# Patient Record
Sex: FEMALE | URBAN - METROPOLITAN AREA
[De-identification: names, ages, dates, MRNs, and addresses within clinical notes are randomized per-mention and may not be internally consistent; named-entity substitution may affect disease eponyms.]

---

## 2018-09-13 ENCOUNTER — APPOINTMENT (RX ONLY)
Dept: URBAN - METROPOLITAN AREA CLINIC 23 | Facility: CLINIC | Age: 73
Setting detail: DERMATOLOGY
End: 2018-09-13

## 2018-09-13 DIAGNOSIS — Z41.9 ENCOUNTER FOR PROCEDURE FOR PURPOSES OTHER THAN REMEDYING HEALTH STATE, UNSPECIFIED: ICD-10-CM

## 2018-09-13 PROCEDURE — ? DYSPORT

## 2018-09-13 NOTE — PROCEDURE: DYSPORT
Topical Anesthesia?: 3% lidocaine, 6% prilocaine
Expiration Date (Month Year): 04/2019
Dilution (U/ 0.1cc): 1.5
Additional Area 4 Units: 0
Price (Use Numbers Only, No Special Characters Or $): 0.00
Consent: Written consent obtained. Risks include but not limited to lid/brow ptosis, bruising, swelling, diplopia, temporary effect, incomplete chemical denervation.
Detail Level: Simple
Additional Area 1 Units: [address]
Additional Area 5 Location: bunny lines
Additional Area 3 Location: high forehead
Additional Area 4 Location: chin
Lot #: [id number]
Additional Area 3 Units: [address] Rohit Cobb
Additional Area 2 Units: 30
Additional Area 1 Location: glabella
Length Of Topical Anesthesia Application (Optional): 15 minutes
Additional Area 2 Location: Lateral eyes

## 2018-09-13 NOTE — PROCEDURE: MIPS QUALITY
Detail Level: Zone
Quality 110: Preventive Care And Screening: Influenza Immunization: Influenza Immunization not Administered for Documented Reasons.
Quality 111:Pneumonia Vaccination Status For Older Adults: Pneumococcal Vaccination Previously Received

## 2019-04-24 ENCOUNTER — APPOINTMENT (RX ONLY)
Dept: URBAN - METROPOLITAN AREA CLINIC 23 | Facility: CLINIC | Age: 74
Setting detail: DERMATOLOGY
End: 2019-04-24

## 2019-04-24 DIAGNOSIS — Z41.9 ENCOUNTER FOR PROCEDURE FOR PURPOSES OTHER THAN REMEDYING HEALTH STATE, UNSPECIFIED: ICD-10-CM

## 2019-04-24 PROCEDURE — ? DIAGNOSIS COMMENT

## 2019-04-24 PROCEDURE — ? DYSPORT

## 2019-04-24 PROCEDURE — ? FILLERS

## 2019-04-24 PROCEDURE — ? PULSED-DYE LASER

## 2019-04-24 NOTE — PROCEDURE: FILLERS
Lateral Face Filler  Volume In Cc: 0
Additional Area 1 Location: perioral fine lines, lateral mouth fine line ( shared with mother)
Additional Area 5 Location: Perioral
Include Cannula Information In Note?: No
Additional Area 1 Location: lateral mouth, fine lines perioral, marionette lines, lateral cheeks, vermillion lips
Additional Area 2 Location: Lips, oral commissure, glabellar fine fines
Lot #: [id number]
Map Statment: See [address] for Complete Details
Additional Area 2 Location: Nasalabial, Glabellar fine lines- Complimentary
Additional Area 3 Location: Glabbellar fine lines, Nasalabial folds, Marionette lines, vermillion lip
Anesthesia Type: 1% lidocaine without epinephrine
Expiration Date (Month Year): 01/31/21
Include Cannula Size?: 25G
Lot #: [id number]
Additional Anesthesia Volume In Cc: 6
Consent: Written consent obtained. Risks include but not limited to bruising, beading, irregular texture, ulceration, infection, allergic reaction, scar formation, incomplete augmentation, temporary nature, procedural pain.
Additional Area 1 Volume In Cc: 1
Additional Area 1 Location: lateral jawline, marionettes lines, lateral mouth and lateral cheeks
Post-Care Instructions: Patient instructed to apply ice to reduce swelling.
Expiration Date (Month Year): 06/23/2020
Additional Area 2 Location: lateral mouth fine lines, marionette fine lines, glabellar fine lines, above the brow fine lines
Include Cannula Length?: 1.5 inch
Detail Level: Zone
Lot #: [id number]
Expiration Date (Month Year): 08/31/2019
Include Cannula Brand?: DermaSculpt
Filler: Hong Davis
Price (Use Numbers Only, No Special Characters Or $): 0.00
Additional Area 4 Location: lateral jawline, lateral cheeks, oral commissure

## 2019-04-24 NOTE — PROCEDURE: DYSPORT
Nasal Root Units: 0
Additional Area 3 Units: 10
Additional Area 5 Location: chin
Additional Area 1 Location: lateral eyes 1.5 cm from bilateral canthus ( touch-up)
Consent: Written consent obtained. Risks include but not limited to lid/brow ptosis, bruising, swelling, diplopia, temporary effect, incomplete chemical denervation.
Lot #: [id number]
Additional Area 5 Units: 4
Dilution (U/ 0.1cc): 1.5
Detail Level: Simple
Additional Area 2 Location: lateral eyes
Additional Area 2 Units: [address] [facility]
Additional Area 4 Location: 2cm high forehead
Glabellar Complex Units: 48
Additional Area 4 Units: [address] Rohit Cobb
Expiration Date (Month Year): 10/31/2019
Price (Use Numbers Only, No Special Characters Or $): 0.00
Additional Area 3 Location: crows feet

## 2020-03-02 ENCOUNTER — APPOINTMENT (RX ONLY)
Dept: URBAN - METROPOLITAN AREA CLINIC 23 | Facility: CLINIC | Age: 75
Setting detail: DERMATOLOGY
End: 2020-03-02

## 2020-03-02 DIAGNOSIS — L82.0 INFLAMED SEBORRHEIC KERATOSIS: ICD-10-CM

## 2020-03-02 DIAGNOSIS — D485 NEOPLASM OF UNCERTAIN BEHAVIOR OF SKIN: ICD-10-CM

## 2020-03-02 DIAGNOSIS — Z41.9 ENCOUNTER FOR PROCEDURE FOR PURPOSES OTHER THAN REMEDYING HEALTH STATE, UNSPECIFIED: ICD-10-CM

## 2020-03-02 PROBLEM — D48.5 NEOPLASM OF UNCERTAIN BEHAVIOR OF SKIN: Status: ACTIVE | Noted: 2020-03-02

## 2020-03-02 PROCEDURE — 11102 TANGNTL BX SKIN SINGLE LES: CPT | Mod: 59

## 2020-03-02 PROCEDURE — ? DYSPORT

## 2020-03-02 PROCEDURE — ? BIOPSY BY SHAVE METHOD

## 2020-03-02 PROCEDURE — ? BENIGN DESTRUCTION

## 2020-03-02 PROCEDURE — ? COUNSELING

## 2020-03-02 PROCEDURE — 17110 DESTRUCTION B9 LES UP TO 14: CPT

## 2020-03-02 ASSESSMENT — LOCATION DETAILED DESCRIPTION DERM
LOCATION DETAILED: RIGHT POSTERIOR AXILLA
LOCATION DETAILED: RIGHT AXILLARY VAULT
LOCATION DETAILED: RIGHT INFERIOR LATERAL FOREHEAD

## 2020-03-02 ASSESSMENT — LOCATION ZONE DERM
LOCATION ZONE: FACE
LOCATION ZONE: AXILLAE

## 2020-03-02 ASSESSMENT — LOCATION SIMPLE DESCRIPTION DERM
LOCATION SIMPLE: RIGHT AXILLARY VAULT
LOCATION SIMPLE: RIGHT FOREHEAD
LOCATION SIMPLE: RIGHT POSTERIOR AXILLA

## 2020-03-02 NOTE — PROCEDURE: BIOPSY BY SHAVE METHOD
Detail Level: Detailed
Depth Of Biopsy: dermis
Was A Bandage Applied: Yes
Size Of Lesion In Cm: 0
Biopsy Type: H and E
Biopsy Method: 15 blade
Anesthesia Type: 1% lidocaine without epinephrine
Anesthesia Volume In Cc (Will Not Render If 0): 0.3
Hemostasis: Electrocautery
Wound Care: Vaseline
Dressing: bandage
Destruction After The Procedure: No
Type Of Destruction Used: Curettage
Curettage Text: The wound bed was treated with curettage after the biopsy was performed.
Cryotherapy Text: The wound bed was treated with cryotherapy after the biopsy was performed.
Electrodesiccation Text: The wound bed was treated with electrodesiccation after the biopsy was performed.
Electrodesiccation And Curettage Text: The wound bed was treated with electrodesiccation and curettage after the biopsy was performed.
Silver Nitrate Text: The wound bed was treated with silver nitrate after the biopsy was performed.
Lab: [facility]
Lab Facility: [address]
Consent: The provider's intent is to obtain a tissue sample solely for diagnostic purposes. Written consent to obtain tissue sample was obtained and risks were reviewed including but not limited to scarring, infection, bleeding, scabbing, incomplete removal, nerve damage and allergy to anesthesia.
Post-Care Instructions: I reviewed with the patient in detail post-care instructions. Patient is to keep the biopsy site dry overnight, and then apply bacitracin twice daily until healed. Patient may apply hydrogen peroxide soaks to remove any crusting.
Notification Instructions: Patient will be notified of biopsy results. However, patient instructed to call the office if not contacted within 2 weeks.
Billing Type: [employer]

## 2020-03-02 NOTE — PROCEDURE: DYSPORT
Additional Area 3 Location: high forehead 3 cm above brows
Consent: Written consent obtained. Risks include but not limited to lid/brow ptosis, bruising, swelling, diplopia, temporary effect, incomplete chemical denervation.
Additional Area 2 Location: crows feet
Price (Use Numbers Only, No Special Characters Or $): 0.00
Masseter Units: 0
Additional Area 4 Location: 2cm high forehead
Glabellar Complex Units: [address]
Additional Area 1 Location: 2.5cm high forehead
Lot #: [id number]
Detail Level: Simple
Dilution (U/ 0.1cc): 1.5
Additional Area 5 Location: glabella
Additional Area 1 Units: 10
Expiration Date (Month Year): 09/30/20

## 2020-03-02 NOTE — PROCEDURE: BENIGN DESTRUCTION
Add 52 Modifier (Optional): no
Consent: The patient's consent was obtained including but not limited to risks of crusting, scabbing, blistering, scarring, darker or lighter pigmentary change, recurrence, incomplete removal and infection.
Detail Level: Simple
Render Post-Care Instructions In Note?: yes
Anesthesia Volume In Cc: 0.3
Post-Care Instructions: I reviewed with the patient in detail post-care instructions. Patient is to wear sunprotection, and avoid picking at any of the treated lesions. Pt may apply Vaseline to crusted or scabbing areas.
Treatment Number (Will Not Render If 0): 0
Medical Necessity Information: It is in your best interest to select a reason for this procedure from the list below. All of these items fulfill various CMS LCD requirements except the new and changing color options.
Medical Necessity Clause: This procedure was medically necessary because the lesions that were treated were:

## 2020-11-17 ENCOUNTER — APPOINTMENT (RX ONLY)
Dept: URBAN - METROPOLITAN AREA CLINIC 23 | Facility: CLINIC | Age: 75
Setting detail: DERMATOLOGY
End: 2020-11-17

## 2020-11-17 VITALS — TEMPERATURE: 97.7 F

## 2020-11-17 DIAGNOSIS — L81.4 OTHER MELANIN HYPERPIGMENTATION: ICD-10-CM

## 2020-11-17 DIAGNOSIS — L82.1 OTHER SEBORRHEIC KERATOSIS: ICD-10-CM

## 2020-11-17 DIAGNOSIS — L70.0 ACNE VULGARIS: ICD-10-CM

## 2020-11-17 DIAGNOSIS — Z41.9 ENCOUNTER FOR PROCEDURE FOR PURPOSES OTHER THAN REMEDYING HEALTH STATE, UNSPECIFIED: ICD-10-CM

## 2020-11-17 DIAGNOSIS — D18.0 HEMANGIOMA: ICD-10-CM

## 2020-11-17 PROBLEM — D18.01 HEMANGIOMA OF SKIN AND SUBCUTANEOUS TISSUE: Status: ACTIVE | Noted: 2020-11-17

## 2020-11-17 PROCEDURE — ? ACNE SURGERY

## 2020-11-17 PROCEDURE — ? COUNSELING

## 2020-11-17 PROCEDURE — ? DYSPORT

## 2020-11-17 PROCEDURE — 10040 EXTRACTION: CPT

## 2020-11-17 PROCEDURE — 99214 OFFICE O/P EST MOD 30 MIN: CPT | Mod: 25

## 2020-11-17 ASSESSMENT — LOCATION ZONE DERM
LOCATION ZONE: EAR
LOCATION ZONE: TRUNK

## 2020-11-17 ASSESSMENT — LOCATION SIMPLE DESCRIPTION DERM
LOCATION SIMPLE: RIGHT UPPER BACK
LOCATION SIMPLE: LEFT UPPER BACK
LOCATION SIMPLE: RIGHT EAR

## 2020-11-17 ASSESSMENT — LOCATION DETAILED DESCRIPTION DERM
LOCATION DETAILED: LEFT SUPERIOR UPPER BACK
LOCATION DETAILED: RIGHT SUPERIOR MEDIAL UPPER BACK
LOCATION DETAILED: LEFT MEDIAL UPPER BACK
LOCATION DETAILED: RIGHT SUPERIOR UPPER BACK
LOCATION DETAILED: RIGHT POSTERIOR EAR

## 2020-11-17 NOTE — PROCEDURE: DYSPORT
Additional Area 3 Location: high forehead 3 cm above brows
Consent: Written consent obtained. Risks include but not limited to lid/brow ptosis, bruising, swelling, diplopia, temporary effect, incomplete chemical denervation.
Additional Area 2 Location: crows feet
Price (Use Numbers Only, No Special Characters Or $): 0.00
Masseter Units: 0
Additional Area 4 Location: 2cm high forehead
Glabellar Complex Units: [address]
Additional Area 1 Location: 2.5cm high forehead
Lot #: [id number]
Detail Level: Simple
Dilution (U/ 0.1cc): 1.5
Additional Area 5 Location: glabella
Additional Area 1 Units: 10
Expiration Date (Month Year): 06/30/2021

## 2020-11-17 NOTE — PROCEDURE: ACNE SURGERY
Render Post-Care Instructions In Note?: no
Hemostasis: Pressure
Post-Care Instructions: I reviewed with the patient in detail post-care instructions. Patient is to keep the treatment areas dry overnight, and then apply bacitracin twice daily until healed. Patient may apply hydrogen peroxide soaks to remove any crusting.
Consent was obtained and risks were reviewed including but not limited to scarring, infection, bleeding, scabbing, incomplete removal, and allergy to anesthesia.
Prep Text (Optional): Prior to removal the treatment areas were prepped in the usual fashion.  1% lidocaine with epi was used to anesthetize the area
Extraction Method: lancet and q-tip
Detail Level: Detailed
Acne Type: Comedonal Lesions

## 2021-03-19 ENCOUNTER — APPOINTMENT (RX ONLY)
Dept: URBAN - METROPOLITAN AREA CLINIC 23 | Facility: CLINIC | Age: 76
Setting detail: DERMATOLOGY
End: 2021-03-19

## 2021-03-19 VITALS — TEMPERATURE: 97.5 F

## 2021-03-19 DIAGNOSIS — L30.9 DERMATITIS, UNSPECIFIED: ICD-10-CM

## 2021-03-19 PROCEDURE — 99212 OFFICE O/P EST SF 10 MIN: CPT

## 2021-03-19 PROCEDURE — ? PRESCRIPTION

## 2021-03-19 PROCEDURE — ? COUNSELING

## 2021-03-19 RX ORDER — CLOBETASOL PROPIONATE 0.5 MG/G
CREAM TOPICAL BID
Qty: 1 | Refills: 1 | Status: ERX | COMMUNITY
Start: 2021-03-19

## 2021-03-19 RX ADMIN — CLOBETASOL PROPIONATE 1: 0.5 CREAM TOPICAL at 00:00

## 2021-03-19 ASSESSMENT — LOCATION SIMPLE DESCRIPTION DERM: LOCATION SIMPLE: LEFT FOREARM

## 2021-03-19 ASSESSMENT — LOCATION ZONE DERM: LOCATION ZONE: ARM

## 2021-03-19 ASSESSMENT — LOCATION DETAILED DESCRIPTION DERM: LOCATION DETAILED: LEFT PROXIMAL DORSAL FOREARM

## 2021-06-17 ENCOUNTER — APPOINTMENT (RX ONLY)
Dept: URBAN - METROPOLITAN AREA CLINIC 23 | Facility: CLINIC | Age: 76
Setting detail: DERMATOLOGY
End: 2021-06-17

## 2021-06-17 DIAGNOSIS — Z41.9 ENCOUNTER FOR PROCEDURE FOR PURPOSES OTHER THAN REMEDYING HEALTH STATE, UNSPECIFIED: ICD-10-CM

## 2021-06-17 PROCEDURE — ? PULSED-DYE LASER

## 2021-06-17 PROCEDURE — ? DYSPORT

## 2021-06-17 NOTE — PROCEDURE: PULSED-DYE LASER
Delay Time (Ms): [address] [facility]
Laser Type: Vbeam 595nm
Pulse Duration: 10 ms
Cryogen Time (Ms): 27
Consent: Written consent obtained, risks reviewed including but not limited to crusting, scabbing, blistering, scarring, darker or lighter pigmentary change, incidental hair removal, bruising, and/or incomplete removal.
Fluence In J/Cm2 (Optional): 7.00
Spot Size: 10x3 mm
Location Override: cheek
Delay Time (Ms): 20
Treated Area: small area
Price (Use Numbers Only, No Special Characters Or $): 0.00
Cryogen Time (Ms): [address] Rohit Cobb
Detail Level: Zone
Pulse Duration: 20 ms
Cryogen Time (Ms): 30
Fluence In J/Cm2 (Optional): 7:00
Spot Size: 10 mm
Post-Procedure Care: Sunscreen applied. Post care reviewed with patient.
Delay Time (Ms): 10
Fluence In J/Cm2 (Optional): 10.0
Spot Size: 7 mm
Post-Care Instructions: I reviewed with the patient in detail post-care instructions. Patient should stay away from the sun and wear sun protection until treated areas are fully healed.

## 2021-06-17 NOTE — PROCEDURE: DYSPORT
Additional Area 3 Location: high forehead 3 cm above brows
Show Mentalis Units: No
[facility] Units: 0
Additional Area 5 Location: glabella
Show Glabellar Units: Yes
Lot #: [id number]
Price (Use Numbers Only, No Special Characters Or $): 0.00
Additional Area 2 Location: crows feet
Additional Area 4 Location: 2cm high forehead
Expiration Date (Month Year): 03/31/22
Detail Level: Simple
Additional Area 1 Units: 10
Glabellar Complex Units: 48
Consent: Written consent obtained. Risks include but not limited to lid/brow ptosis, bruising, swelling, diplopia, temporary effect, incomplete chemical denervation.
Dilution (U/ 0.1cc): 1.5
Additional Area 1 Location: 2.5cm high forehead

## 2021-11-30 ENCOUNTER — APPOINTMENT (RX ONLY)
Dept: URBAN - METROPOLITAN AREA CLINIC 23 | Facility: CLINIC | Age: 76
Setting detail: DERMATOLOGY
End: 2021-11-30

## 2021-11-30 DIAGNOSIS — D22 MELANOCYTIC NEVI: ICD-10-CM

## 2021-11-30 DIAGNOSIS — D18.0 HEMANGIOMA: ICD-10-CM

## 2021-11-30 DIAGNOSIS — Z41.9 ENCOUNTER FOR PROCEDURE FOR PURPOSES OTHER THAN REMEDYING HEALTH STATE, UNSPECIFIED: ICD-10-CM

## 2021-11-30 PROBLEM — D18.01 HEMANGIOMA OF SKIN AND SUBCUTANEOUS TISSUE: Status: ACTIVE | Noted: 2021-11-30

## 2021-11-30 PROBLEM — D22.5 MELANOCYTIC NEVI OF TRUNK: Status: ACTIVE | Noted: 2021-11-30

## 2021-11-30 PROCEDURE — ? COUNSELING

## 2021-11-30 PROCEDURE — 99213 OFFICE O/P EST LOW 20 MIN: CPT

## 2021-11-30 PROCEDURE — ? DYSPORT

## 2021-11-30 PROCEDURE — ? SUNSCREEN RECOMMENDATIONS

## 2021-11-30 ASSESSMENT — LOCATION SIMPLE DESCRIPTION DERM: LOCATION SIMPLE: LEFT UPPER BACK

## 2021-11-30 ASSESSMENT — LOCATION DETAILED DESCRIPTION DERM
LOCATION DETAILED: LEFT MEDIAL UPPER BACK
LOCATION DETAILED: LEFT SUPERIOR UPPER BACK

## 2021-11-30 ASSESSMENT — LOCATION ZONE DERM: LOCATION ZONE: TRUNK

## 2021-11-30 NOTE — HPI: FULL BODY SKIN EXAMINATION
What Is The Reason For Today's Visit?: Full Body Skin Examination
What Is The Reason For Today's Visit? (Being Monitored For X): concerning skin lesions on an annual basis
yes

## 2021-11-30 NOTE — PROCEDURE: DYSPORT
Glabellar Complex Units: [address]
Show Additional Area 3: Yes
Show Right And Left Periorbital Units: No
Masseter Units: 0
Additional Area 1 Location: high forehead
Consent: Written consent obtained. Risks include but not limited to lid/brow ptosis, bruising, swelling, diplopia, temporary effect, incomplete chemical denervation.
Additional Area 4 Location: 2cm high forehead
Expiration Date (Month Year): 2022-08
Lot #: [id number]
Price (Use Numbers Only, No Special Characters Or $): 0.00
Additional Area 2 Location: crows feet
Detail Level: Simple
Additional Area 3 Location: high forehead 3 cm above brows
Additional Area 5 Location: glabella
Dilution (U/ 0.1cc): 1.5
Forehead Units: 10

## 2022-01-24 ENCOUNTER — APPOINTMENT (RX ONLY)
Dept: URBAN - METROPOLITAN AREA CLINIC 23 | Facility: CLINIC | Age: 77
Setting detail: DERMATOLOGY
End: 2022-01-24

## 2022-01-24 DIAGNOSIS — Z41.9 ENCOUNTER FOR PROCEDURE FOR PURPOSES OTHER THAN REMEDYING HEALTH STATE, UNSPECIFIED: ICD-10-CM

## 2022-01-24 PROCEDURE — ? DYSPORT

## 2022-01-24 NOTE — PROCEDURE: DYSPORT
Show Nasal Units: Yes
Additional Area 2 Units: 0
Lot #: [id number]
Show Mentalis Units: No
Price (Use Numbers Only, No Special Characters Or $): 0.00
Additional Comments: periorbital 10 units NC
Additional Area 3 Location: high forehead 3 cm above brows
Additional Area 5 Location: glabella
Additional Area 1 Location: neck
Dilution (U/ 0.1cc): 1.5
Additional Area 1 Units: [address] [facility]
Consent: Written consent obtained. Risks include but not limited to lid/brow ptosis, bruising, swelling, diplopia, temporary effect, incomplete chemical denervation.
Additional Area 4 Location: 2cm high forehead
Detail Level: Simple
Periorbital Skin Units: 10
Expiration Date (Month Year): 2022-08
Additional Area 2 Location: crows feet

## 2022-01-31 ENCOUNTER — APPOINTMENT (RX ONLY)
Dept: URBAN - METROPOLITAN AREA CLINIC 23 | Facility: CLINIC | Age: 77
Setting detail: DERMATOLOGY
End: 2022-01-31

## 2022-01-31 DIAGNOSIS — Z41.9 ENCOUNTER FOR PROCEDURE FOR PURPOSES OTHER THAN REMEDYING HEALTH STATE, UNSPECIFIED: ICD-10-CM

## 2022-01-31 PROCEDURE — ? DYSPORT

## 2022-01-31 NOTE — PROCEDURE: DYSPORT
Lot #: [id number]
Right Periorbital Units: 0
Show Additional Area 2: Yes
Additional Area 5 Location: glabella
Show Mentalis Units: No
Additional Area 3 Location: high forehead 3 cm above brows
Dilution (U/ 0.1cc): 1.5
Consent: Written consent obtained. Risks include but not limited to lid/brow ptosis, bruising, swelling, diplopia, temporary effect, incomplete chemical denervation.
Additional Area 1 Location: neck bands ( special)
Additional Area 1 Units: 10
Expiration Date (Month Year): 2022-08
Additional Area 4 Location: 2cm high forehead
Additional Area 2 Location: crows feet
Detail Level: Simple
Price (Use Numbers Only, No Special Characters Or $): 0.00

## 2022-03-28 ENCOUNTER — APPOINTMENT (RX ONLY)
Dept: URBAN - METROPOLITAN AREA CLINIC 23 | Facility: CLINIC | Age: 77
Setting detail: DERMATOLOGY
End: 2022-03-28

## 2022-03-28 DIAGNOSIS — Z41.9 ENCOUNTER FOR PROCEDURE FOR PURPOSES OTHER THAN REMEDYING HEALTH STATE, UNSPECIFIED: ICD-10-CM

## 2022-03-28 PROCEDURE — ? DYSPORT

## 2022-09-02 ENCOUNTER — APPOINTMENT (RX ONLY)
Dept: URBAN - METROPOLITAN AREA CLINIC 23 | Facility: CLINIC | Age: 77
Setting detail: DERMATOLOGY
End: 2022-09-02

## 2022-09-02 DIAGNOSIS — L81.5 LEUKODERMA, NOT ELSEWHERE CLASSIFIED: ICD-10-CM

## 2022-09-02 DIAGNOSIS — Z41.9 ENCOUNTER FOR PROCEDURE FOR PURPOSES OTHER THAN REMEDYING HEALTH STATE, UNSPECIFIED: ICD-10-CM

## 2022-09-02 DIAGNOSIS — L81.4 OTHER MELANIN HYPERPIGMENTATION: ICD-10-CM

## 2022-09-02 DIAGNOSIS — D18.0 HEMANGIOMA: ICD-10-CM

## 2022-09-02 PROBLEM — D18.01 HEMANGIOMA OF SKIN AND SUBCUTANEOUS TISSUE: Status: ACTIVE | Noted: 2022-09-02

## 2022-09-02 PROCEDURE — 99213 OFFICE O/P EST LOW 20 MIN: CPT

## 2022-09-02 PROCEDURE — ? DYSPORT

## 2022-09-02 PROCEDURE — ? COUNSELING

## 2022-09-02 ASSESSMENT — LOCATION DETAILED DESCRIPTION DERM
LOCATION DETAILED: LEFT PROXIMAL PRETIBIAL REGION
LOCATION DETAILED: LEFT SUPERIOR ANTERIOR NECK
LOCATION DETAILED: SUPERIOR THORACIC SPINE
LOCATION DETAILED: RIGHT ANTERIOR DISTAL THIGH
LOCATION DETAILED: RIGHT SUPERIOR LATERAL MALAR CHEEK
LOCATION DETAILED: RIGHT PROXIMAL PRETIBIAL REGION
LOCATION DETAILED: INFERIOR MID FOREHEAD
LOCATION DETAILED: LEFT SUPERIOR LATERAL MALAR CHEEK

## 2022-09-02 ASSESSMENT — LOCATION SIMPLE DESCRIPTION DERM
LOCATION SIMPLE: LEFT CHEEK
LOCATION SIMPLE: RIGHT CHEEK
LOCATION SIMPLE: UPPER BACK
LOCATION SIMPLE: INFERIOR FOREHEAD
LOCATION SIMPLE: RIGHT THIGH
LOCATION SIMPLE: RIGHT PRETIBIAL REGION
LOCATION SIMPLE: LEFT PRETIBIAL REGION
LOCATION SIMPLE: LEFT ANTERIOR NECK

## 2022-09-02 ASSESSMENT — LOCATION ZONE DERM
LOCATION ZONE: FACE
LOCATION ZONE: NECK
LOCATION ZONE: TRUNK
LOCATION ZONE: LEG

## 2022-10-17 ENCOUNTER — APPOINTMENT (RX ONLY)
Dept: URBAN - METROPOLITAN AREA CLINIC 23 | Facility: CLINIC | Age: 77
Setting detail: DERMATOLOGY
End: 2022-10-17

## 2022-10-17 DIAGNOSIS — Z41.9 ENCOUNTER FOR PROCEDURE FOR PURPOSES OTHER THAN REMEDYING HEALTH STATE, UNSPECIFIED: ICD-10-CM

## 2022-10-17 PROCEDURE — ? PULSED-DYE LASER

## 2022-10-17 PROCEDURE — ? DYSPORT

## 2022-10-17 NOTE — PROCEDURE: DYSPORT
Depressor Anguli Oris Units: 0
Show Additional Area 6: Yes
Additional Area 2 Location: crows feet
Show Inventory Tab: Show
Consent: Written consent obtained. Risks include but not limited to lid/brow ptosis, bruising, swelling, diplopia, temporary effect, incomplete chemical denervation.
Lot #: [id number]
Show Ucl Units: No
Additional Area 3 Location: glabella
Dilution (U/ 0.1cc): 1.5
Expiration Date (Month Year): 2022-08
Additional Area 4 Location: 2cm high forehead
Additional Area 1 Location: high forehead 3 cm above brows
Price (Use Numbers Only, No Special Characters Or $): 0.00
Detail Level: Simple
Additional Area 1 Units: 15

## 2023-01-10 ENCOUNTER — APPOINTMENT (RX ONLY)
Dept: URBAN - METROPOLITAN AREA CLINIC 23 | Facility: CLINIC | Age: 78
Setting detail: DERMATOLOGY
End: 2023-01-10

## 2023-01-10 DIAGNOSIS — Z41.9 ENCOUNTER FOR PROCEDURE FOR PURPOSES OTHER THAN REMEDYING HEALTH STATE, UNSPECIFIED: ICD-10-CM

## 2023-01-10 PROCEDURE — ? IN-HOUSE DISPENSING PHARMACY

## 2023-01-10 PROCEDURE — ? DYSPORT

## 2023-01-10 NOTE — PROCEDURE: IN-HOUSE DISPENSING PHARMACY
Product 72 Refills: 0
Product 8 Amount/Unit (Numbers Only): 1
Product 42 Unit Type: mg
Product 1 Unit Type: tube(s)
Product 6 Application Directions: Take one tablet for swelling today at the office as indicated and one tablet tomorrow
Name Of Product 2: Valtrex
Name Of Product 11: Clearing gel
Product 6 Unit Type: tablets
Product 11 Application Directions: Apply a thin layer to the acne prone areas in the AM
Name Of Product 7: Latisse 5ml
Name Of Product 19: Diazepam 5mg
Product 2 Application Directions: apply to affected areas left lower leg am and pm  as indicated
Product 11 Unit Type: bottle(s)
Product 9 Unit Type: jar(s)
Product 7 Amount/Unit (Numbers Only): 5
Render Refills If Set To 0: No
Product 5 Application Directions: Take one tablet today after procedure with full glass of water as indicated
Name Of Product 22: Brightening Pads
Name Of Product 1: Hydroquinone 4% / Tretinoin 0.05% / Fluocinolone 0.01%
Product 14 Application Directions: Apply to the under eyes in the Am and pm
Name Of Product 10: Valtrex 500 mg
Product 10 Application Directions: Take 1 tablet 2 times daily for 3 days
Name Of Product 6: Prednisone 20mg
Product 3 Amount/Unit (Numbers Only): 60 [facility]
Product 1 Application Directions: Apply thin layer to right cheek every night  at bedtime only.
Product 22 Application Directions: Apply to the affected area of the face
Product 2 Price/Unit (In Dollars): 0.00
Product 13 Application Directions: Apply to the entire face in the Am and pm
Product 4 Application Directions: Take one tablet half hour today prior to procedure as indicated.  Dispense in office
Name Of Product 21: Valium 5mg
Detail Level: Zone
Name Of Product 9: Clearing tone pads
Product 9 Application Directions: Apply to the acne prone areas in the Am and Pm
Name Of Product 5: loratadine 10 mg
Name Of Product 14: Skin Better Interfuse eye treatment cream
Product 2 Amount/Unit (Numbers Only): 6
Product 21 Application Directions: Take one tablet 30 minutes prior to procedure
Product 7 Application Directions: Apply to lashes at bedtime daily as indicated
Name Of Product 3: Tretinoin 0.05% cream
Name Of Product 12: Retinol Lite
Product 7 Unit Type: ml
Product 12 Application Directions: Apply a pea size amount to the entire face at bedtime.
Product 3 Application Directions: Apply a pea size amount to the entire face at night
Name Of Product 8: TNS Essential
Product 3 Unit Type: grams
Product 8 Application Directions: Apply to the clean face in the AM and PM daily
Name Of Product 4: Valium 5 mg
Name Of Product 13: Skin Better Even tone correcting serum

## 2023-01-10 NOTE — PROCEDURE: DYSPORT
Consent: Written consent obtained. Risks include but not limited to lid/brow ptosis, bruising, swelling, diplopia, temporary effect, incomplete chemical denervation.
Lot #: [id number]
Additional Area 2 Units: 0
Show Ucl Units: No
Show Additional Area 6: Yes
Additional Area 3 Location: glabella
Forehead Units: 10
Dilution (U/ 0.1cc): 1.5
Detail Level: Simple
Expiration Date (Month Year): 2022-08
Additional Area 4 Location: 2cm high forehead
Show Inventory Tab: Show
Periorbital Skin Units: [address]
Additional Area 1 Location: neck bands
Price (Use Numbers Only, No Special Characters Or $): 0.00
Additional Area 2 Location: crows feet

## 2023-02-21 NOTE — PROCEDURE: DYSPORT
Lateral Platysmal Bands Units: 0
Additional Area 3 Location: high forehead 3 cm above brows
Show Forehead Units: Yes
Show Ucl Units: No
Additional Area 5 Location: glabella
Dilution (U/ 0.1cc): 1.5
Detail Level: Simple
Glabellar Complex Units: [address] Rohit Cobb
Consent: Written consent obtained. Risks include but not limited to lid/brow ptosis, bruising, swelling, diplopia, temporary effect, incomplete chemical denervation.
Additional Area 1 Location: neck bands
Expiration Date (Month Year): 2022-11
Additional Area 4 Location: 2cm high forehead
Additional Area 1 Units: [address] [facility]
Price (Use Numbers Only, No Special Characters Or $): 0.00
Periorbital Skin Units: 30
Lot #: [id number]
Additional Area 2 Location: crows feet
Show Inventory Tab: Show
Is Xerosis Present?: Yes - Normal Treatment

## 2023-05-01 ENCOUNTER — APPOINTMENT (RX ONLY)
Dept: URBAN - METROPOLITAN AREA CLINIC 23 | Facility: CLINIC | Age: 78
Setting detail: DERMATOLOGY
End: 2023-05-01

## 2023-05-01 DIAGNOSIS — L82.1 OTHER SEBORRHEIC KERATOSIS: ICD-10-CM

## 2023-05-01 DIAGNOSIS — L82.0 INFLAMED SEBORRHEIC KERATOSIS: ICD-10-CM

## 2023-05-01 DIAGNOSIS — D22 MELANOCYTIC NEVI: ICD-10-CM

## 2023-05-01 DIAGNOSIS — Z41.9 ENCOUNTER FOR PROCEDURE FOR PURPOSES OTHER THAN REMEDYING HEALTH STATE, UNSPECIFIED: ICD-10-CM

## 2023-05-01 PROBLEM — D22.9 MELANOCYTIC NEVI, UNSPECIFIED: Status: ACTIVE | Noted: 2023-05-01

## 2023-05-01 PROCEDURE — ? DYSPORT

## 2023-05-01 PROCEDURE — ? LIQUID NITROGEN

## 2023-05-01 PROCEDURE — 99213 OFFICE O/P EST LOW 20 MIN: CPT | Mod: 25

## 2023-05-01 PROCEDURE — ? SUNSCREEN RECOMMENDATIONS

## 2023-05-01 PROCEDURE — ? COUNSELING

## 2023-05-01 PROCEDURE — 17110 DESTRUCTION B9 LES UP TO 14: CPT

## 2023-05-01 ASSESSMENT — LOCATION SIMPLE DESCRIPTION DERM
LOCATION SIMPLE: CHEST
LOCATION SIMPLE: UPPER BACK

## 2023-05-01 ASSESSMENT — LOCATION DETAILED DESCRIPTION DERM
LOCATION DETAILED: INFERIOR THORACIC SPINE
LOCATION DETAILED: UPPER STERNUM

## 2023-05-01 ASSESSMENT — LOCATION ZONE DERM: LOCATION ZONE: TRUNK

## 2023-05-01 NOTE — HPI: FULL BODY SKIN EXAMINATION
What Is The Reason For Today's Visit?: Full Body Skin Examination
What Is The Reason For Today's Visit? (Being Monitored For X): concerning skin lesions on an annual basis
Patient

## 2023-05-01 NOTE — PROCEDURE: LIQUID NITROGEN
Show Applicator Variable?: Yes
Spray Paint Text: The liquid nitrogen was applied to the skin utilizing a spray paint frosting technique.
Add 52 Modifier (Optional): no
Post-Care Instructions: I reviewed with the patient in detail post-care instructions. Patient is to wear sunprotection, and avoid picking at any of the treated lesions. Pt may apply Vaseline to crusted or scabbing areas.
Duration Of Freeze Thaw-Cycle (Seconds): 5-10
Consent: The patient's consent was obtained including but not limited to risks of crusting, scabbing, blistering, scarring, darker or lighter pigmentary change, recurrence, incomplete removal and infection.
Medical Necessity Information: It is in your best interest to select a reason for this procedure from the list below. All of these items fulfill various CMS LCD requirements except the new and changing color options.
Medical Necessity Clause: This procedure was medically necessary because the lesions that were treated were:
Number Of Freeze-Thaw Cycles: 2 freeze-thaw cycles
Detail Level: Simple

## 2023-05-01 NOTE — PROCEDURE: DYSPORT
Additional Area 1 Location: neck bands
Periorbital Skin Units: [address] [facility]
Additional Area 5 Units: 0
Price (Use Numbers Only, No Special Characters Or $): 0.00
Show Right And Left Brow Units: No
Show Depressor Anguli Units: Yes
Additional Area 5 Location: glabella
Expiration Date (Month Year): 2022-08
Additional Area 4 Location: 2cm high forehead
Dilution (U/ 0.1cc): 1.5
Glabellar Complex Units: [address]
Detail Level: Simple
Additional Area 2 Units: 10
Lot #: [id number]
Consent: Written consent obtained. Risks include but not limited to lid/brow ptosis, bruising, swelling, diplopia, temporary effect, incomplete chemical denervation.
Show Inventory Tab: Show
Additional Area 2 Location: lat brows
Additional Area 1 Units: [address]

## 2023-09-21 ENCOUNTER — APPOINTMENT (RX ONLY)
Dept: URBAN - METROPOLITAN AREA CLINIC 23 | Facility: CLINIC | Age: 78
Setting detail: DERMATOLOGY
End: 2023-09-21

## 2023-09-21 DIAGNOSIS — Z41.9 ENCOUNTER FOR PROCEDURE FOR PURPOSES OTHER THAN REMEDYING HEALTH STATE, UNSPECIFIED: ICD-10-CM

## 2023-09-21 PROCEDURE — ? DYSPORT

## 2023-09-21 NOTE — PROCEDURE: DYSPORT
Additional Area 4 Location: 2cm high forehead
Show Forehead Units: Yes
Expiration Date (Month Year): 2022-08
Right Pupillary Line Units: 0
Detail Level: Simple
Show Right And Left Pupillary Line Units: No
Price (Use Numbers Only, No Special Characters Or $): 0.00
Additional Area 5 Location: glabella
Show Inventory Tab: Show
Additional Area 1 Units: [address] [facility]
Periorbital Skin Units: 20
Additional Area 1 Location: neck bands
Additional Area 2 Location: lat brows
Additional Area 2 Units: 10
Consent: Written consent obtained. Risks include but not limited to lid/brow ptosis, bruising, swelling, diplopia, temporary effect, incomplete chemical denervation.
Lot #: [id number]
Glabellar Complex Units: [address]
Dilution (U/ 0.1cc): 1.5

## 2023-12-04 ENCOUNTER — APPOINTMENT (RX ONLY)
Dept: URBAN - METROPOLITAN AREA CLINIC 23 | Facility: CLINIC | Age: 78
Setting detail: DERMATOLOGY
End: 2023-12-04

## 2023-12-04 DIAGNOSIS — Z41.9 ENCOUNTER FOR PROCEDURE FOR PURPOSES OTHER THAN REMEDYING HEALTH STATE, UNSPECIFIED: ICD-10-CM

## 2023-12-04 PROCEDURE — ? PULSED-DYE LASER

## 2023-12-04 NOTE — PROCEDURE: PULSED-DYE LASER
Delay Time (Ms): 10
Spot Size: 10x3 mm
Detail Level: Zone
Length Of Topical Anesthesia Application (Optional): 15 minutes
Laser Type: Vbeam 595nm
Spot Size: 10 mm
Treated Area: small area
Pulse Duration: 10 ms
Cryogen Time (Ms): 30
Location (Required For Details To Render In Note): lower face
Delay Time (Ms): 20
Fluence In J/Cm2 (Optional): 10.0
Post-Care Instructions: I reviewed with the patient in detail post-care instructions. Patient should stay away from the sun and wear sun protection until treated areas are fully healed.
Price (Use Numbers Only, No Special Characters Or $): 350.00
Fluence In J/Cm2 (Optional): 7.00
Spot Size: 7 mm
Consent: Written consent obtained, risks reviewed including but not limited to crusting, scabbing, blistering, scarring, darker or lighter pigmentary change, incidental hair removal, bruising, and/or incomplete removal.
Treated Area: medium area
Post-Procedure Care: Sunscreen applied. Post care reviewed with patient.
Fluence In J/Cm2 (Optional): 10.50

## 2024-02-06 ENCOUNTER — APPOINTMENT (RX ONLY)
Dept: URBAN - METROPOLITAN AREA CLINIC 23 | Facility: CLINIC | Age: 79
Setting detail: DERMATOLOGY
End: 2024-02-06

## 2024-02-06 DIAGNOSIS — Z41.9 ENCOUNTER FOR PROCEDURE FOR PURPOSES OTHER THAN REMEDYING HEALTH STATE, UNSPECIFIED: ICD-10-CM

## 2024-02-06 PROCEDURE — ? RECOMMENDATIONS

## 2024-02-06 PROCEDURE — ? DYSPORT

## 2024-02-06 NOTE — PROCEDURE: DYSPORT
Additional Area 5 Units: 0
Price (Use Numbers Only, No Special Characters Or $): 0.00
Additional Area 1 Location: lat brows
Show Depressor Anguli Units: Yes
Glabellar Complex Units: 50
Show Right And Left Brow Units: No
Detail Level: Simple
Additional Area 5 Location: glabella
Additional Area 1 Units: 10
Additional Area 2 Location: crows feet
Show Inventory Tab: Show
Consent: Written consent obtained. Risks include but not limited to lid/brow ptosis, bruising, swelling, diplopia, temporary effect, incomplete chemical denervation.
Lot #: [id number]
Periorbital Skin Units: 40
Dilution (U/0.1 Cc): 1.5
Additional Area 4 Location: 2cm high forehead
Forehead Units: 20
Expiration Date (Month Year): 2022-08

## 2024-02-06 NOTE — PROCEDURE: RECOMMENDATIONS
Recommendations (Free Text): PRX
Render Risk Assessment In Note?: no
Detail Level: Detailed
Recommendation Preamble: The following recommendations were made during the visit:

## 2024-06-03 ENCOUNTER — APPOINTMENT (RX ONLY)
Dept: URBAN - METROPOLITAN AREA CLINIC 23 | Facility: CLINIC | Age: 79
Setting detail: DERMATOLOGY
End: 2024-06-03

## 2024-06-03 DIAGNOSIS — D18.0 HEMANGIOMA: ICD-10-CM

## 2024-06-03 DIAGNOSIS — D22 MELANOCYTIC NEVI: ICD-10-CM

## 2024-06-03 DIAGNOSIS — Z41.9 ENCOUNTER FOR PROCEDURE FOR PURPOSES OTHER THAN REMEDYING HEALTH STATE, UNSPECIFIED: ICD-10-CM

## 2024-06-03 PROBLEM — D18.01 HEMANGIOMA OF SKIN AND SUBCUTANEOUS TISSUE: Status: ACTIVE | Noted: 2024-06-03

## 2024-06-03 PROBLEM — D22.9 MELANOCYTIC NEVI, UNSPECIFIED: Status: ACTIVE | Noted: 2024-06-03

## 2024-06-03 PROCEDURE — ? DYSPORT

## 2024-06-03 PROCEDURE — ? SUNSCREEN RECOMMENDATIONS

## 2024-06-03 PROCEDURE — 99213 OFFICE O/P EST LOW 20 MIN: CPT

## 2024-06-03 PROCEDURE — ? COUNSELING

## 2024-06-03 ASSESSMENT — LOCATION DETAILED DESCRIPTION DERM: LOCATION DETAILED: RIGHT MEDIAL SUPERIOR CHEST

## 2024-06-03 ASSESSMENT — LOCATION SIMPLE DESCRIPTION DERM: LOCATION SIMPLE: CHEST

## 2024-06-03 ASSESSMENT — LOCATION ZONE DERM: LOCATION ZONE: TRUNK

## 2024-06-03 NOTE — PROCEDURE: DYSPORT
Left Periorbital Skin Units: 0
Show Masseter Units: Yes
Lot #: [id number]
Consent: Written consent obtained. Risks include but not limited to lid/brow ptosis, bruising, swelling, diplopia, temporary effect, incomplete chemical denervation.
Show Right And Left Periorbital Units: No
Additional Area 1 Location: lat brows
Dilution (U/0.1 Cc): 1.5
Detail Level: Simple
Additional Area 1 Units: 10
Glabellar Complex Units: 50
Additional Area 2 Location: crows feet
Additional Area 4 Location: 2cm high forehead
Expiration Date (Month Year): 2022-08
Show Inventory Tab: Show
Additional Area 5 Location: glabella
Price (Use Numbers Only, No Special Characters Or $): 0.00
Periorbital Skin Units: 60

## 2024-10-28 ENCOUNTER — APPOINTMENT (RX ONLY)
Dept: URBAN - METROPOLITAN AREA CLINIC 23 | Facility: CLINIC | Age: 79
Setting detail: DERMATOLOGY
End: 2024-10-28

## 2024-10-28 DIAGNOSIS — Z41.9 ENCOUNTER FOR PROCEDURE FOR PURPOSES OTHER THAN REMEDYING HEALTH STATE, UNSPECIFIED: ICD-10-CM

## 2024-10-28 PROCEDURE — ? DYSPORT

## 2024-10-28 NOTE — PROCEDURE: DYSPORT
Left Periorbital Skin Units: 0
Show Masseter Units: Yes
Lot #: [id number]
Consent: Written consent obtained. Risks include but not limited to lid/brow ptosis, bruising, swelling, diplopia, temporary effect, incomplete chemical denervation.
Show Right And Left Periorbital Units: No
Additional Area 1 Location: lat brows
Dilution (U/0.1 Cc): 1.5
Detail Level: Simple
Additional Area 1 Units: 10
Glabellar Complex Units: 50
Additional Area 2 Location: crows feet
Additional Area 4 Location: 2cm high forehead
Expiration Date (Month Year): 2022-08
Show Inventory Tab: Show
Additional Area 5 Location: glabella
Price (Use Numbers Only, No Special Characters Or $): 0.00
Periorbital Skin Units: 60

## 2025-03-12 NOTE — PROCEDURE: PULSED-DYE LASER
Pulse Duration: 10 ms
Location (Required For Details To Render In Note But Body Touch Will Also Count For First Location): cheeks
Fluence In J/Cm2 (Optional): 10.00
Delay Time (Ms): 5166 [facility]
Spot Size: 10x3 mm
Delay Time (Ms): 20
Laser Type: Vbeam 595nm
Treated Area: small area
Spot Size: 7 mm
Cryogen Time (Ms): [address] Rohit Cobb
Cryogen Time (Ms): 30
Fluence In J/Cm2 (Optional): 10.0
Post-Care Instructions: I reviewed with the patient in detail post-care instructions. Patient should stay away from the sun and wear sun protection until treated areas are fully healed.
Price (Use Numbers Only, No Special Characters Or $): 350.00
Detail Level: Zone
Fluence In J/Cm2 (Optional): 7.00
Delay Time (Ms): 10
Consent: Written consent obtained, risks reviewed including but not limited to crusting, scabbing, blistering, scarring, darker or lighter pigmentary change, incidental hair removal, bruising, and/or incomplete removal.
Treated Area: medium area
IMPROVE-DD Application Not Available
Post-Procedure Care: Sunscreen applied. Post care reviewed with patient.

## 2025-03-18 ENCOUNTER — APPOINTMENT (OUTPATIENT)
Dept: URBAN - METROPOLITAN AREA CLINIC 23 | Facility: CLINIC | Age: 80
Setting detail: DERMATOLOGY
End: 2025-03-18

## 2025-03-18 DIAGNOSIS — L82.1 OTHER SEBORRHEIC KERATOSIS: ICD-10-CM

## 2025-03-18 DIAGNOSIS — D22 MELANOCYTIC NEVI: ICD-10-CM

## 2025-03-18 DIAGNOSIS — D18.0 HEMANGIOMA: ICD-10-CM

## 2025-03-18 DIAGNOSIS — L81.4 OTHER MELANIN HYPERPIGMENTATION: ICD-10-CM

## 2025-03-18 DIAGNOSIS — Z41.9 ENCOUNTER FOR PROCEDURE FOR PURPOSES OTHER THAN REMEDYING HEALTH STATE, UNSPECIFIED: ICD-10-CM

## 2025-03-18 PROBLEM — D22.5 MELANOCYTIC NEVI OF TRUNK: Status: ACTIVE | Noted: 2025-03-18

## 2025-03-18 PROBLEM — D18.01 HEMANGIOMA OF SKIN AND SUBCUTANEOUS TISSUE: Status: ACTIVE | Noted: 2025-03-18

## 2025-03-18 PROBLEM — D23.72 OTHER BENIGN NEOPLASM OF SKIN OF LEFT LOWER LIMB, INCLUDING HIP: Status: ACTIVE | Noted: 2025-03-18

## 2025-03-18 PROCEDURE — ? DYSPORT

## 2025-03-18 PROCEDURE — ? COUNSELING

## 2025-03-18 PROCEDURE — 99212 OFFICE O/P EST SF 10 MIN: CPT

## 2025-03-18 ASSESSMENT — LOCATION SIMPLE DESCRIPTION DERM
LOCATION SIMPLE: ABDOMEN
LOCATION SIMPLE: RIGHT UPPER BACK

## 2025-03-18 ASSESSMENT — LOCATION DETAILED DESCRIPTION DERM
LOCATION DETAILED: LEFT RIB CAGE
LOCATION DETAILED: RIGHT SUPERIOR MEDIAL UPPER BACK
LOCATION DETAILED: LEFT LATERAL ABDOMEN

## 2025-03-18 ASSESSMENT — LOCATION ZONE DERM: LOCATION ZONE: TRUNK

## 2025-03-18 NOTE — PROCEDURE: DYSPORT
Show Glabellar Units: Yes
Inferior Lateral Orbicularis Oculi Units: 0
Show Mentalis Units: No
Dilution (U/0.1 Cc): 1.5
Show Inventory Tab: Show
Expiration Date (Month Year): 2022-08
Additional Area 4 Location: 2cm high forehead
Additional Area 5 Location: glabella
Additional Area 1 Location: neck bands
Price (Use Numbers Only, No Special Characters Or $): 0.00
Periorbital Skin Units: 50
Additional Area 1 Units: 60
Additional Area 2 Location: crows feet
Forehead Units: 10
Consent: Written consent obtained. Risks include but not limited to lid/brow ptosis, bruising, swelling, diplopia, temporary effect, incomplete chemical denervation.
Lot #: [id number]
Detail Level: Simple

## 2025-04-14 ENCOUNTER — APPOINTMENT (OUTPATIENT)
Dept: URBAN - METROPOLITAN AREA CLINIC 23 | Facility: CLINIC | Age: 80
Setting detail: DERMATOLOGY
End: 2025-04-14

## 2025-04-14 DIAGNOSIS — Z71.89 OTHER SPECIFIED COUNSELING: ICD-10-CM

## 2025-04-14 DIAGNOSIS — L44.8 OTHER SPECIFIED PAPULOSQUAMOUS DISORDERS: ICD-10-CM

## 2025-04-14 PROCEDURE — 99212 OFFICE O/P EST SF 10 MIN: CPT

## 2025-04-14 PROCEDURE — ? COUNSELING

## 2025-04-14 ASSESSMENT — LOCATION SIMPLE DESCRIPTION DERM: LOCATION SIMPLE: RIGHT UPPER ARM

## 2025-04-14 ASSESSMENT — LOCATION ZONE DERM: LOCATION ZONE: ARM

## 2025-04-14 ASSESSMENT — LOCATION DETAILED DESCRIPTION DERM: LOCATION DETAILED: RIGHT ANTERIOR PROXIMAL UPPER ARM
